# Patient Record
Sex: FEMALE | Race: WHITE | NOT HISPANIC OR LATINO | Employment: FULL TIME | URBAN - METROPOLITAN AREA
[De-identification: names, ages, dates, MRNs, and addresses within clinical notes are randomized per-mention and may not be internally consistent; named-entity substitution may affect disease eponyms.]

---

## 2017-01-09 DIAGNOSIS — O01.9 GESTATIONAL TROPHOBLASTIC DISEASE: ICD-10-CM

## 2017-01-09 RX ORDER — NORGESTIMATE AND ETHINYL ESTRADIOL 7DAYSX3 28
1 KIT ORAL DAILY
Qty: 28 TABLET | Refills: 11 | Status: SHIPPED | OUTPATIENT
Start: 2017-01-09 | End: 2017-12-18 | Stop reason: SDUPTHER

## 2017-01-31 ENCOUNTER — TELEPHONE (OUTPATIENT)
Dept: GYNECOLOGIC ONCOLOGY | Facility: CLINIC | Age: 27
End: 2017-01-31

## 2017-01-31 ENCOUNTER — PATIENT MESSAGE (OUTPATIENT)
Dept: GYNECOLOGIC ONCOLOGY | Facility: CLINIC | Age: 27
End: 2017-01-31

## 2017-01-31 ENCOUNTER — LAB VISIT (OUTPATIENT)
Dept: LAB | Facility: HOSPITAL | Age: 27
End: 2017-01-31
Attending: OBSTETRICS & GYNECOLOGY
Payer: COMMERCIAL

## 2017-01-31 DIAGNOSIS — O01.9 GESTATIONAL TROPHOBLASTIC DISEASE: Primary | ICD-10-CM

## 2017-01-31 DIAGNOSIS — O01.9 GESTATIONAL TROPHOBLASTIC DISEASE: ICD-10-CM

## 2017-01-31 LAB — HCG INTACT+B SERPL-ACNC: <1.2 MIU/ML

## 2017-01-31 PROCEDURE — 36415 COLL VENOUS BLD VENIPUNCTURE: CPT | Mod: PO

## 2017-01-31 PROCEDURE — 84702 CHORIONIC GONADOTROPIN TEST: CPT

## 2017-02-21 ENCOUNTER — PATIENT MESSAGE (OUTPATIENT)
Dept: GYNECOLOGIC ONCOLOGY | Facility: CLINIC | Age: 27
End: 2017-02-21

## 2017-02-27 ENCOUNTER — LAB VISIT (OUTPATIENT)
Dept: LAB | Facility: HOSPITAL | Age: 27
End: 2017-02-27
Attending: OBSTETRICS & GYNECOLOGY
Payer: COMMERCIAL

## 2017-02-27 DIAGNOSIS — O01.9 GESTATIONAL TROPHOBLASTIC DISEASE: ICD-10-CM

## 2017-02-27 LAB — HCG INTACT+B SERPL-ACNC: <1.2 MIU/ML

## 2017-02-27 PROCEDURE — 84702 CHORIONIC GONADOTROPIN TEST: CPT

## 2017-02-27 PROCEDURE — 36415 COLL VENOUS BLD VENIPUNCTURE: CPT | Mod: PO

## 2017-03-06 ENCOUNTER — PATIENT MESSAGE (OUTPATIENT)
Dept: GYNECOLOGIC ONCOLOGY | Facility: CLINIC | Age: 27
End: 2017-03-06

## 2017-03-29 ENCOUNTER — LAB VISIT (OUTPATIENT)
Dept: LAB | Facility: HOSPITAL | Age: 27
End: 2017-03-29
Attending: OBSTETRICS & GYNECOLOGY
Payer: COMMERCIAL

## 2017-03-29 ENCOUNTER — OFFICE VISIT (OUTPATIENT)
Dept: GYNECOLOGIC ONCOLOGY | Facility: CLINIC | Age: 27
End: 2017-03-29
Payer: COMMERCIAL

## 2017-03-29 VITALS
HEART RATE: 77 BPM | DIASTOLIC BLOOD PRESSURE: 66 MMHG | SYSTOLIC BLOOD PRESSURE: 111 MMHG | BODY MASS INDEX: 29.29 KG/M2 | WEIGHT: 187 LBS

## 2017-03-29 DIAGNOSIS — O01.9 GESTATIONAL TROPHOBLASTIC DISEASE: ICD-10-CM

## 2017-03-29 DIAGNOSIS — O01.9 GESTATIONAL TROPHOBLASTIC DISEASE: Primary | ICD-10-CM

## 2017-03-29 DIAGNOSIS — Z01.419 WELL WOMAN EXAM WITH ROUTINE GYNECOLOGICAL EXAM: ICD-10-CM

## 2017-03-29 LAB — HCG INTACT+B SERPL-ACNC: <1.2 MIU/ML

## 2017-03-29 PROCEDURE — 84702 CHORIONIC GONADOTROPIN TEST: CPT

## 2017-03-29 PROCEDURE — 99999 PR PBB SHADOW E&M-EST. PATIENT-LVL III: CPT | Mod: PBBFAC,,, | Performed by: OBSTETRICS & GYNECOLOGY

## 2017-03-29 PROCEDURE — 36415 COLL VENOUS BLD VENIPUNCTURE: CPT

## 2017-03-29 PROCEDURE — 99214 OFFICE O/P EST MOD 30 MIN: CPT | Mod: S$GLB,,, | Performed by: OBSTETRICS & GYNECOLOGY

## 2017-03-29 PROCEDURE — 88175 CYTOPATH C/V AUTO FLUID REDO: CPT

## 2017-03-29 PROCEDURE — 1160F RVW MEDS BY RX/DR IN RCRD: CPT | Mod: S$GLB,,, | Performed by: OBSTETRICS & GYNECOLOGY

## 2017-03-29 NOTE — PROGRESS NOTES
Subjective:      Patient ID: Adalgisa Ramos is a 26 y.o. female.    Chief Complaint: GTD (3mth f/u)      HPI  Patient is a 25yo female  who originally presented to establish care for active gestational trophoblastic disease. LMP 2016 and presented to her ob/gyn at approximately 14weeks gestation. She had a 20 week size uterus with US findings consistent with molar gestation. She underwent D&C 16, final pathology showing compete mole. Pre-evacuation bhcg 310,8150.      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 WHO score 6, low risk. They elected to treat with MTX Days 1-5 q2 weeks. She received C1 on 16 and is now s/p C5 (last treatment 16).      bhcg trend:   36715  7/15 14373   5672   1363   719   251   72    38   34   31      Failed MTX, changed to Act-D.      S/p C5 of Act-D with normalization of hcg. HCG decreased to 39>19>8.2>5.2>1.5>1.9><1.2><1.2><1.2. hcg remains normal (completed treatment 16).     Patient presents today for routine follow up visit. States she has been doing well and is without gyn complaints. Monthly hcg levels have all been < 1.2, including todays hcg. She has been taking OCPs for contraception, and states her LMP was  and normal.      Review of Systems   Constitutional: Negative for appetite change, chills, fatigue and fever.   HENT: Negative for mouth sores.    Respiratory: Negative for cough and shortness of breath.    Cardiovascular: Negative for leg swelling.   Gastrointestinal: Negative for abdominal pain, blood in stool, constipation and diarrhea.   Endocrine: Negative for cold intolerance.   Genitourinary: Negative for dysuria and vaginal bleeding.   Musculoskeletal: Negative for myalgias.   Skin: Negative for rash.   Allergic/Immunologic: Negative.    Neurological: Negative for  weakness and numbness.   Hematological: Negative for adenopathy. Does not bruise/bleed easily.   Psychiatric/Behavioral: Negative for confusion.        Objective:   Physical Exam:   Constitutional: She is oriented to person, place, and time. She appears well-developed and well-nourished. No distress.    HENT:   Head: Normocephalic and atraumatic.    Eyes: EOM are normal. Pupils are equal, round, and reactive to light. No scleral icterus.    Neck: Normal range of motion. Neck supple. No thyromegaly present.    Cardiovascular: Normal rate, regular rhythm, normal heart sounds and intact distal pulses.  Exam reveals no cyanosis and no edema.     Pulmonary/Chest: Effort normal and breath sounds normal. No tachypnea. No respiratory distress. She has no wheezes. She has no rhonchi. She has no rales. She exhibits no tenderness.        Abdominal: Soft. Bowel sounds are normal. She exhibits no distension, no ascites and no mass. There is no hepatosplenomegaly. There is no tenderness. There is no rigidity, no rebound and no guarding. No hernia.     Genitourinary: Rectum normal, vagina normal and uterus normal. Pelvic exam was performed with patient supine. There is no rash, tenderness or lesion on the right labia. There is no rash, tenderness or lesion on the left labia. Uterus is not tender. Cervix is normal. Right adnexum displays no mass, no tenderness and no fullness. Left adnexum displays no mass, no tenderness and no fullness. No bleeding in the vagina. No vaginal discharge found. Labial bartholins normal.  Genitourinary Comments: Very small introitus made obtaining pap difficult.           Musculoskeletal: Normal range of motion and moves all extremeties. She exhibits no edema.      Lymphadenopathy:     She has no cervical adenopathy.        Right: No inguinal and no supraclavicular adenopathy present.        Left: No inguinal and no supraclavicular adenopathy present.    Neurological: She is alert and oriented to  person, place, and time. No sensory deficit.    Skin: Skin is warm and dry. No rash noted. No cyanosis or erythema.    Psychiatric: She has a normal mood and affect. Her behavior is normal. Thought content normal.       Assessment:     1. Gestational trophoblastic disease    2. Well woman exam with routine gynecological exam          Plan:     Gestational trophoblastic disease   - RAISA on exam   - RTC in 3 months or sooner if needed   - Continue monthly [facility]     Well woman exam with routine gynecological exam  -     Liquid-based pap smear, diagnostic

## 2017-04-04 ENCOUNTER — TELEPHONE (OUTPATIENT)
Dept: GYNECOLOGIC ONCOLOGY | Facility: CLINIC | Age: 27
End: 2017-04-04

## 2017-04-04 NOTE — TELEPHONE ENCOUNTER
----- Message from Kathleen Gill PA-C sent at 4/4/2017 12:48 PM CDT -----  Please let the patient know that her pap was normal. Thank you!

## 2017-04-05 ENCOUNTER — TELEPHONE (OUTPATIENT)
Dept: GYNECOLOGIC ONCOLOGY | Facility: CLINIC | Age: 27
End: 2017-04-05

## 2017-04-27 ENCOUNTER — LAB VISIT (OUTPATIENT)
Dept: LAB | Facility: HOSPITAL | Age: 27
End: 2017-04-27
Attending: OBSTETRICS & GYNECOLOGY
Payer: COMMERCIAL

## 2017-04-27 DIAGNOSIS — O01.9 GESTATIONAL TROPHOBLASTIC DISEASE: ICD-10-CM

## 2017-04-27 LAB — HCG INTACT+B SERPL-ACNC: <1.2 MIU/ML

## 2017-04-27 PROCEDURE — 36415 COLL VENOUS BLD VENIPUNCTURE: CPT | Mod: PO

## 2017-04-27 PROCEDURE — 84702 CHORIONIC GONADOTROPIN TEST: CPT

## 2017-05-19 ENCOUNTER — PATIENT MESSAGE (OUTPATIENT)
Dept: GYNECOLOGIC ONCOLOGY | Facility: CLINIC | Age: 27
End: 2017-05-19

## 2017-05-29 ENCOUNTER — LAB VISIT (OUTPATIENT)
Dept: LAB | Facility: HOSPITAL | Age: 27
End: 2017-05-29
Attending: OBSTETRICS & GYNECOLOGY
Payer: COMMERCIAL

## 2017-05-29 DIAGNOSIS — O01.9 GESTATIONAL TROPHOBLASTIC DISEASE: ICD-10-CM

## 2017-05-29 LAB — HCG INTACT+B SERPL-ACNC: <1.2 MIU/ML

## 2017-05-29 PROCEDURE — 36415 COLL VENOUS BLD VENIPUNCTURE: CPT | Mod: PO

## 2017-05-29 PROCEDURE — 84702 CHORIONIC GONADOTROPIN TEST: CPT

## 2017-06-28 ENCOUNTER — LAB VISIT (OUTPATIENT)
Dept: LAB | Facility: HOSPITAL | Age: 27
End: 2017-06-28
Attending: OBSTETRICS & GYNECOLOGY
Payer: COMMERCIAL

## 2017-06-28 ENCOUNTER — OFFICE VISIT (OUTPATIENT)
Dept: GYNECOLOGIC ONCOLOGY | Facility: CLINIC | Age: 27
End: 2017-06-28
Payer: COMMERCIAL

## 2017-06-28 VITALS
HEART RATE: 64 BPM | SYSTOLIC BLOOD PRESSURE: 105 MMHG | DIASTOLIC BLOOD PRESSURE: 72 MMHG | BODY MASS INDEX: 28.19 KG/M2 | WEIGHT: 180 LBS

## 2017-06-28 DIAGNOSIS — O01.9 GESTATIONAL TROPHOBLASTIC DISEASE: Primary | ICD-10-CM

## 2017-06-28 DIAGNOSIS — O01.9 GESTATIONAL TROPHOBLASTIC DISEASE: ICD-10-CM

## 2017-06-28 LAB — HCG INTACT+B SERPL-ACNC: <1.2 MIU/ML

## 2017-06-28 PROCEDURE — 99999 PR PBB SHADOW E&M-EST. PATIENT-LVL II: CPT | Mod: PBBFAC,,, | Performed by: OBSTETRICS & GYNECOLOGY

## 2017-06-28 PROCEDURE — 84702 CHORIONIC GONADOTROPIN TEST: CPT

## 2017-06-28 PROCEDURE — 99214 OFFICE O/P EST MOD 30 MIN: CPT | Mod: S$GLB,,, | Performed by: OBSTETRICS & GYNECOLOGY

## 2017-06-28 PROCEDURE — 36415 COLL VENOUS BLD VENIPUNCTURE: CPT

## 2017-07-04 NOTE — PROGRESS NOTES
Subjective:      Patient ID: Adalgisa Ramos is a 27 y.o. female.    Chief Complaint: Follow-up (3mth)      HPI  Patient is a 25yo female  who originally presented to establish care for active gestational trophoblastic disease. LMP 2016 and presented to her ob/gyn at approximately 14weeks gestation. She had a 20 week size uterus with US findings consistent with molar gestation. She underwent D&C 16, final pathology showing compete mole. Pre-evacuation bhcg 310,8150.      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 WHO score 6, low risk. They elected to treat with MTX Days 1-5 q2 weeks. She received C1 on 16 and is now s/p C5 (last treatment 16).      bhcg trend:   37641  7/15 24041   5672   1363   719   251   72    38   34   31      Failed MTX, changed to Act-D.      S/p C5 of Act-D with normalization of hcg. HCG decreased to 39>19>8.2>5.2>1.5>1.9><1.2><1.2><1.2. hcg remains normal (completed treatment 16).      Patient presents today for routine follow up visit. States she has been doing well and is without gyn complaints. Monthly hcg levels have all been < 1.2, including todays hcg. She has been taking OCPs for contraception.    Review of Systems   Constitutional: Negative for appetite change, chills, fatigue and fever.   HENT: Negative for mouth sores.    Respiratory: Negative for cough and shortness of breath.    Cardiovascular: Negative for leg swelling.   Gastrointestinal: Negative for abdominal pain, blood in stool, constipation and diarrhea.   Endocrine: Negative for cold intolerance.   Genitourinary: Negative for dysuria and vaginal bleeding.   Musculoskeletal: Negative for myalgias.   Skin: Negative for rash.   Allergic/Immunologic: Negative.    Neurological: Negative for weakness and numbness.    Hematological: Negative for adenopathy. Does not bruise/bleed easily.   Psychiatric/Behavioral: Negative for confusion.        Objective:   Physical Exam:   Constitutional: She is oriented to person, place, and time. She appears well-developed and well-nourished.    HENT:   Head: Normocephalic and atraumatic.    Eyes: EOM are normal. Pupils are equal, round, and reactive to light.    Neck: Normal range of motion. Neck supple. No thyromegaly present.    Cardiovascular: Normal rate, regular rhythm and intact distal pulses.     Pulmonary/Chest: Effort normal and breath sounds normal. No respiratory distress. She has no wheezes.        Abdominal: Soft. Bowel sounds are normal. She exhibits no distension, no ascites and no mass. There is no tenderness.     Genitourinary: Rectum normal, vagina normal and uterus normal. Pelvic exam was performed with patient supine. There is no lesion on the right labia. There is no lesion on the left labia. Cervix is normal. Right adnexum displays no mass. Left adnexum displays no mass.           Musculoskeletal: Normal range of motion and moves all extremeties.      Lymphadenopathy:     She has no cervical adenopathy.        Right: No inguinal and no supraclavicular adenopathy present.        Left: No inguinal and no supraclavicular adenopathy present.    Neurological: She is alert and oriented to person, place, and time.    Skin: Skin is warm and dry. No rash noted.    Psychiatric: She has a normal mood and affect.       Assessment:     1. Gestational trophoblastic disease      - no evidence of disease on today's exam  - hcg remains normal  - disease free interval 7 months    Plan:   No orders of the defined types were placed in this encounter.    RTC 3 months for surveillance or sooner if needed. Continue OCPs for contraception, stressed importance with compliance. Continue q monthly hcg.

## 2017-07-15 ENCOUNTER — PATIENT MESSAGE (OUTPATIENT)
Dept: GYNECOLOGIC ONCOLOGY | Facility: CLINIC | Age: 27
End: 2017-07-15

## 2017-07-26 ENCOUNTER — PATIENT MESSAGE (OUTPATIENT)
Dept: GYNECOLOGIC ONCOLOGY | Facility: CLINIC | Age: 27
End: 2017-07-26

## 2017-07-31 ENCOUNTER — LAB VISIT (OUTPATIENT)
Dept: LAB | Facility: HOSPITAL | Age: 27
End: 2017-07-31
Attending: OBSTETRICS & GYNECOLOGY
Payer: COMMERCIAL

## 2017-07-31 DIAGNOSIS — O01.9 GESTATIONAL TROPHOBLASTIC DISEASE: ICD-10-CM

## 2017-07-31 LAB — HCG INTACT+B SERPL-ACNC: <1.2 MIU/ML

## 2017-07-31 PROCEDURE — 84702 CHORIONIC GONADOTROPIN TEST: CPT

## 2017-07-31 PROCEDURE — 36415 COLL VENOUS BLD VENIPUNCTURE: CPT | Mod: PO

## 2017-08-28 ENCOUNTER — LAB VISIT (OUTPATIENT)
Dept: LAB | Facility: HOSPITAL | Age: 27
End: 2017-08-28
Attending: OBSTETRICS & GYNECOLOGY
Payer: COMMERCIAL

## 2017-08-28 DIAGNOSIS — O01.9 GESTATIONAL TROPHOBLASTIC DISEASE: ICD-10-CM

## 2017-08-28 LAB — HCG INTACT+B SERPL-ACNC: <1.2 MIU/ML

## 2017-08-28 PROCEDURE — 84702 CHORIONIC GONADOTROPIN TEST: CPT

## 2017-08-28 PROCEDURE — 36415 COLL VENOUS BLD VENIPUNCTURE: CPT | Mod: PO

## 2017-09-25 ENCOUNTER — LAB VISIT (OUTPATIENT)
Dept: LAB | Facility: HOSPITAL | Age: 27
End: 2017-09-25
Attending: OBSTETRICS & GYNECOLOGY
Payer: COMMERCIAL

## 2017-09-25 ENCOUNTER — OFFICE VISIT (OUTPATIENT)
Dept: GYNECOLOGIC ONCOLOGY | Facility: CLINIC | Age: 27
End: 2017-09-25
Payer: COMMERCIAL

## 2017-09-25 VITALS
HEART RATE: 85 BPM | WEIGHT: 178.63 LBS | BODY MASS INDEX: 27.97 KG/M2 | DIASTOLIC BLOOD PRESSURE: 65 MMHG | SYSTOLIC BLOOD PRESSURE: 108 MMHG

## 2017-09-25 DIAGNOSIS — O01.9 GESTATIONAL TROPHOBLASTIC DISEASE: Primary | ICD-10-CM

## 2017-09-25 DIAGNOSIS — O01.9 GESTATIONAL TROPHOBLASTIC DISEASE: ICD-10-CM

## 2017-09-25 LAB — HCG INTACT+B SERPL-ACNC: 1.6 MIU/ML

## 2017-09-25 PROCEDURE — 99999 PR PBB SHADOW E&M-EST. PATIENT-LVL III: CPT | Mod: PBBFAC,,, | Performed by: OBSTETRICS & GYNECOLOGY

## 2017-09-25 PROCEDURE — 3008F BODY MASS INDEX DOCD: CPT | Mod: S$GLB,,, | Performed by: OBSTETRICS & GYNECOLOGY

## 2017-09-25 PROCEDURE — 84702 CHORIONIC GONADOTROPIN TEST: CPT

## 2017-09-25 PROCEDURE — 36415 COLL VENOUS BLD VENIPUNCTURE: CPT

## 2017-09-25 PROCEDURE — 99214 OFFICE O/P EST MOD 30 MIN: CPT | Mod: S$GLB,,, | Performed by: OBSTETRICS & GYNECOLOGY

## 2017-09-26 ENCOUNTER — TELEPHONE (OUTPATIENT)
Dept: GYNECOLOGIC ONCOLOGY | Facility: CLINIC | Age: 27
End: 2017-09-26

## 2017-09-26 DIAGNOSIS — O01.9 GESTATIONAL TROPHOBLASTIC DISEASE: Primary | ICD-10-CM

## 2017-09-26 NOTE — TELEPHONE ENCOUNTER
Called and discussed bhcg results of 1.6 (prior values have been <1.2). I have recommended trending the value over the next 2 weeks. She will draw each Monday. Instructions given and lab orders entered. Will route message to staff to assist with scheduling.     She voiced understanding.

## 2017-09-28 NOTE — PROGRESS NOTES
Subjective:      Patient ID: Adalgisa Ramos is a 27 y.o. female.    Chief Complaint: Follow-up (3 month visit)      HPI  Patient is a 25yo female  who originally presented to establish care for active gestational trophoblastic disease. LMP 2016 and presented to her ob/gyn at approximately 14weeks gestation. She had a 20 week size uterus with US findings consistent with molar gestation. She underwent D&C 16, final pathology showing compete mole. Pre-evacuation bhcg 310,8150.      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 WHO score 6, low risk. They elected to treat with MTX Days 1-5 q2 weeks. She received C1 on 16 and is now s/p C5 (last treatment 16).      bhcg trend:   99320  7/15 21054   5672   1363   719   251   72    38   34   31      Failed MTX, changed to Act-D.      S/p C5 of Act-D with normalization of hcg. HCG decreased to 39>19>8.2>5.2>1.5>1.9><1.2><1.2><1.2. (completed treatment 16).      Patient presents today for routine follow up visit. States she has been doing well and is without gyn complaints. She has been taking OCPs for contraception. hcg pending today.  Review of Systems   Constitutional: Negative for appetite change, chills, fatigue and fever.   HENT: Negative for mouth sores.    Respiratory: Negative for cough and shortness of breath.    Cardiovascular: Negative for leg swelling.   Gastrointestinal: Negative for abdominal pain, blood in stool, constipation and diarrhea.   Endocrine: Negative for cold intolerance.   Genitourinary: Negative for dysuria and vaginal bleeding.   Musculoskeletal: Negative for myalgias.   Skin: Negative for rash.   Allergic/Immunologic: Negative.    Neurological: Negative for weakness and numbness.   Hematological: Negative for adenopathy. Does not bruise/bleed  easily.   Psychiatric/Behavioral: Negative for confusion.        Objective:   Physical Exam:   Constitutional: She is oriented to person, place, and time. She appears well-developed and well-nourished.    HENT:   Head: Normocephalic and atraumatic.    Eyes: EOM are normal. Pupils are equal, round, and reactive to light.    Neck: Normal range of motion. Neck supple. No thyromegaly present.    Cardiovascular: Normal rate, regular rhythm and intact distal pulses.     Pulmonary/Chest: Effort normal and breath sounds normal. No respiratory distress. She has no wheezes.        Abdominal: Soft. Bowel sounds are normal. She exhibits no distension, no ascites and no mass. There is no tenderness.     Genitourinary: Rectum normal, vagina normal and uterus normal. Pelvic exam was performed with patient supine. There is no lesion on the right labia. There is no lesion on the left labia. Cervix is normal. Right adnexum displays no mass. Left adnexum displays no mass.   Genitourinary Comments: menstrual bleeding           Musculoskeletal: Normal range of motion and moves all extremeties.      Lymphadenopathy:     She has no cervical adenopathy.        Right: No inguinal and no supraclavicular adenopathy present.        Left: No inguinal and no supraclavicular adenopathy present.    Neurological: She is alert and oriented to person, place, and time.    Skin: Skin is warm and dry. No rash noted.    Psychiatric: She has a normal mood and affect.       Assessment:     1. Gestational trophoblastic disease      - no evidence of disease on today's exam  - hcg pending  - disease free interval 9 months  - on OCPs for contraception    Plan:   No orders of the defined types were placed in this encounter.    RTC 3 months for surveillance or sooner if need. Continue to follow hcg.

## 2017-10-02 ENCOUNTER — LAB VISIT (OUTPATIENT)
Dept: LAB | Facility: HOSPITAL | Age: 27
End: 2017-10-02
Attending: OBSTETRICS & GYNECOLOGY
Payer: COMMERCIAL

## 2017-10-02 DIAGNOSIS — O01.9 GESTATIONAL TROPHOBLASTIC DISEASE: ICD-10-CM

## 2017-10-02 LAB — HCG INTACT+B SERPL-ACNC: 1.9 MIU/ML

## 2017-10-02 PROCEDURE — 84702 CHORIONIC GONADOTROPIN TEST: CPT

## 2017-10-02 PROCEDURE — 36415 COLL VENOUS BLD VENIPUNCTURE: CPT | Mod: PO

## 2017-10-09 ENCOUNTER — LAB VISIT (OUTPATIENT)
Dept: LAB | Facility: HOSPITAL | Age: 27
End: 2017-10-09
Attending: OBSTETRICS & GYNECOLOGY
Payer: COMMERCIAL

## 2017-10-09 DIAGNOSIS — O01.9 GESTATIONAL TROPHOBLASTIC DISEASE: ICD-10-CM

## 2017-10-09 LAB — HCG INTACT+B SERPL-ACNC: <1.2 MIU/ML

## 2017-10-09 PROCEDURE — 84702 CHORIONIC GONADOTROPIN TEST: CPT

## 2017-10-09 PROCEDURE — 36415 COLL VENOUS BLD VENIPUNCTURE: CPT | Mod: PO

## 2017-10-10 ENCOUNTER — TELEPHONE (OUTPATIENT)
Dept: GYNECOLOGIC ONCOLOGY | Facility: CLINIC | Age: 27
End: 2017-10-10

## 2017-10-10 NOTE — TELEPHONE ENCOUNTER
Called and let patient know hcg <1.2. No upward trend or signs of recurrent GTD. She voiced understanding. Will continue with monthly hcg check.

## 2017-11-15 ENCOUNTER — PATIENT MESSAGE (OUTPATIENT)
Dept: GYNECOLOGIC ONCOLOGY | Facility: CLINIC | Age: 27
End: 2017-11-15

## 2017-11-16 ENCOUNTER — LAB VISIT (OUTPATIENT)
Dept: LAB | Facility: HOSPITAL | Age: 27
End: 2017-11-16
Attending: OBSTETRICS & GYNECOLOGY
Payer: COMMERCIAL

## 2017-11-16 DIAGNOSIS — O01.9 GESTATIONAL TROPHOBLASTIC DISEASE: ICD-10-CM

## 2017-11-16 LAB — HCG INTACT+B SERPL-ACNC: <1.2 MIU/ML

## 2017-11-16 PROCEDURE — 36415 COLL VENOUS BLD VENIPUNCTURE: CPT | Mod: PO

## 2017-11-16 PROCEDURE — 84702 CHORIONIC GONADOTROPIN TEST: CPT

## 2017-12-18 ENCOUNTER — TELEPHONE (OUTPATIENT)
Dept: GYNECOLOGIC ONCOLOGY | Facility: CLINIC | Age: 27
End: 2017-12-18

## 2017-12-18 ENCOUNTER — OFFICE VISIT (OUTPATIENT)
Dept: GYNECOLOGIC ONCOLOGY | Facility: CLINIC | Age: 27
End: 2017-12-18
Payer: COMMERCIAL

## 2017-12-18 ENCOUNTER — PATIENT MESSAGE (OUTPATIENT)
Dept: GYNECOLOGIC ONCOLOGY | Facility: CLINIC | Age: 27
End: 2017-12-18

## 2017-12-18 VITALS
SYSTOLIC BLOOD PRESSURE: 122 MMHG | WEIGHT: 180.81 LBS | HEART RATE: 86 BPM | BODY MASS INDEX: 28.32 KG/M2 | DIASTOLIC BLOOD PRESSURE: 81 MMHG

## 2017-12-18 DIAGNOSIS — O01.9 GESTATIONAL TROPHOBLASTIC DISEASE: ICD-10-CM

## 2017-12-18 PROCEDURE — 99213 OFFICE O/P EST LOW 20 MIN: CPT | Mod: S$GLB,,, | Performed by: OBSTETRICS & GYNECOLOGY

## 2017-12-18 PROCEDURE — 99999 PR PBB SHADOW E&M-EST. PATIENT-LVL III: CPT | Mod: PBBFAC,,, | Performed by: OBSTETRICS & GYNECOLOGY

## 2017-12-18 RX ORDER — NORGESTIMATE AND ETHINYL ESTRADIOL 7DAYSX3 28
1 KIT ORAL DAILY
Qty: 28 TABLET | Refills: 11 | Status: SHIPPED | OUTPATIENT
Start: 2017-12-18 | End: 2018-01-22 | Stop reason: SDUPTHER

## 2017-12-18 NOTE — PROGRESS NOTES
Subjective:      Patient ID: Adalgisa Ramos is a 27 y.o. female.    Chief Complaint: Follow-up      HPI  Patient is a 27yo female  who originally presented to establish care for active gestational trophoblastic disease. LMP 2016 and presented to her ob/gyn at approximately 14weeks gestation. She had a 20 week size uterus with US findings consistent with molar gestation. She underwent D&C 16, final pathology showing compete mole. Pre-evacuation bhcg 310,8150.      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 WHO score 6, low risk. They elected to treat with MTX Days 1-5 q2 weeks. She received C1 on 16 and is now s/p C5 (last treatment 16).      bhcg trend:   49173  7/15 77467   5672   1363   719   251   72    38   34   31      Failed MTX, changed to Act-D.      S/p C5 of Act-D with normalization of hcg. HCG decreased to 39>19>8.2>5.2>1.5>1.9><1.2><1.2><1.2>1.9><1.2><1.2. (completed treatment 16).     Pap 3/29/17 negative.      Patient presents today for routine follow up visit. Hcg normalized x 1 year. States she has been doing well and is without gyn complaints. She has been taking OCPs for contraception.     Review of Systems   Constitutional: Negative for appetite change, chills, fatigue and fever.   HENT: Negative for mouth sores.    Respiratory: Negative for cough and shortness of breath.    Cardiovascular: Negative for leg swelling.   Gastrointestinal: Negative for abdominal pain, blood in stool, constipation and diarrhea.   Endocrine: Negative for cold intolerance.   Genitourinary: Negative for dysuria and vaginal bleeding.   Musculoskeletal: Negative for myalgias.   Skin: Negative for rash.   Allergic/Immunologic: Negative.    Neurological: Negative for weakness and numbness.   Hematological: Negative for  adenopathy. Does not bruise/bleed easily.   Psychiatric/Behavioral: Negative for confusion.       Objective:   Physical Exam:   Constitutional: She is oriented to person, place, and time. She appears well-developed and well-nourished.    HENT:   Head: Normocephalic and atraumatic.    Eyes: EOM are normal. Pupils are equal, round, and reactive to light.    Neck: Normal range of motion. Neck supple. No thyromegaly present.    Cardiovascular: Normal rate, regular rhythm and intact distal pulses.     Pulmonary/Chest: Effort normal and breath sounds normal. No respiratory distress. She has no wheezes.        Abdominal: Soft. Bowel sounds are normal. She exhibits no distension, no ascites and no mass. There is no tenderness.     Genitourinary: Rectum normal, vagina normal and uterus normal. Pelvic exam was performed with patient supine. There is no lesion on the right labia. There is no lesion on the left labia. Cervix is normal. Right adnexum displays no mass. Left adnexum displays no mass.           Musculoskeletal: Normal range of motion and moves all extremeties.      Lymphadenopathy:     She has no cervical adenopathy.        Right: No inguinal and no supraclavicular adenopathy present.        Left: No inguinal and no supraclavicular adenopathy present.    Neurological: She is alert and oriented to person, place, and time.    Skin: Skin is warm and dry. No rash noted.    Psychiatric: She has a normal mood and affect.       Assessment:     1. Gestational trophoblastic disease      - no evidence of disease  - normalized hcg x 1year    Plan:     Orders Placed This Encounter   Procedures    hCG, quantitative     Okay to return to routine well woman care. May follow up with me as needed.

## 2017-12-21 ENCOUNTER — LAB VISIT (OUTPATIENT)
Dept: LAB | Facility: HOSPITAL | Age: 27
End: 2017-12-21
Attending: OBSTETRICS & GYNECOLOGY
Payer: COMMERCIAL

## 2017-12-21 DIAGNOSIS — O01.9 GESTATIONAL TROPHOBLASTIC DISEASE: ICD-10-CM

## 2017-12-21 LAB — HCG INTACT+B SERPL-ACNC: <1.2 MIU/ML

## 2017-12-21 PROCEDURE — 36415 COLL VENOUS BLD VENIPUNCTURE: CPT | Mod: PO

## 2017-12-21 PROCEDURE — 84702 CHORIONIC GONADOTROPIN TEST: CPT

## 2018-01-22 DIAGNOSIS — O01.9 GESTATIONAL TROPHOBLASTIC DISEASE: ICD-10-CM

## 2018-01-22 RX ORDER — NORGESTIMATE AND ETHINYL ESTRADIOL 7DAYSX3 28
1 KIT ORAL DAILY
Qty: 28 TABLET | Refills: 11 | Status: SHIPPED | OUTPATIENT
Start: 2018-01-22

## 2018-05-05 ENCOUNTER — HOSPITAL ENCOUNTER (EMERGENCY)
Facility: HOSPITAL | Age: 28
Discharge: HOME | End: 2018-05-05
Attending: EMERGENCY MEDICINE
Payer: COMMERCIAL

## 2018-05-05 VITALS
OXYGEN SATURATION: 100 % | DIASTOLIC BLOOD PRESSURE: 74 MMHG | HEIGHT: 69 IN | SYSTOLIC BLOOD PRESSURE: 118 MMHG | BODY MASS INDEX: 26.66 KG/M2 | WEIGHT: 180 LBS | HEART RATE: 97 BPM | RESPIRATION RATE: 16 BRPM | TEMPERATURE: 99 F

## 2018-05-05 DIAGNOSIS — R51.9 FRONTAL HEADACHE: ICD-10-CM

## 2018-05-05 DIAGNOSIS — R05.9 COUGH: ICD-10-CM

## 2018-05-05 DIAGNOSIS — B30.9 VIRAL CONJUNCTIVITIS OF LEFT EYE: ICD-10-CM

## 2018-05-05 DIAGNOSIS — J20.9 ACUTE BRONCHITIS, UNSPECIFIED ORGANISM: Primary | ICD-10-CM

## 2018-05-05 PROCEDURE — 96372 THER/PROPH/DIAG INJ SC/IM: CPT

## 2018-05-05 PROCEDURE — 99284 EMERGENCY DEPT VISIT MOD MDM: CPT | Mod: 25

## 2018-05-05 PROCEDURE — 63600175 PHARM REV CODE 636 W HCPCS: Performed by: EMERGENCY MEDICINE

## 2018-05-05 RX ORDER — IBUPROFEN 800 MG/1
800 TABLET ORAL EVERY 6 HOURS PRN
Qty: 20 TABLET | Refills: 0 | Status: SHIPPED | OUTPATIENT
Start: 2018-05-05

## 2018-05-05 RX ORDER — KETOROLAC TROMETHAMINE 30 MG/ML
30 INJECTION, SOLUTION INTRAMUSCULAR; INTRAVENOUS
Status: COMPLETED | OUTPATIENT
Start: 2018-05-05 | End: 2018-05-05

## 2018-05-05 RX ORDER — METHYLPREDNISOLONE SOD SUCC 125 MG
125 VIAL (EA) INJECTION
Status: COMPLETED | OUTPATIENT
Start: 2018-05-05 | End: 2018-05-05

## 2018-05-05 RX ORDER — PREDNISONE 50 MG/1
50 TABLET ORAL DAILY
Qty: 4 TABLET | Refills: 0 | Status: SHIPPED | OUTPATIENT
Start: 2018-05-05 | End: 2018-05-09

## 2018-05-05 RX ORDER — ALBUTEROL SULFATE 90 UG/1
1-2 AEROSOL, METERED RESPIRATORY (INHALATION) EVERY 6 HOURS PRN
Qty: 1 INHALER | Refills: 0 | Status: SHIPPED | OUTPATIENT
Start: 2018-05-05 | End: 2019-05-05

## 2018-05-05 RX ORDER — BENZONATATE 100 MG/1
100 CAPSULE ORAL 3 TIMES DAILY PRN
Qty: 20 CAPSULE | Refills: 0 | Status: SHIPPED | OUTPATIENT
Start: 2018-05-05 | End: 2018-05-12

## 2018-05-05 RX ADMIN — METHYLPREDNISOLONE SODIUM SUCCINATE 125 MG: 125 INJECTION, POWDER, FOR SOLUTION INTRAMUSCULAR; INTRAVENOUS at 03:05

## 2018-05-05 RX ADMIN — KETOROLAC TROMETHAMINE 30 MG: 30 INJECTION, SOLUTION INTRAMUSCULAR at 03:05

## 2018-05-06 NOTE — ED PROVIDER NOTES
Encounter Date: 5/5/2018       History     Chief Complaint   Patient presents with    General Illness     congestion x2weeks, L eye red and teary     This patient is a 27-year-old female with a past history of gestational trophoblastic neoplasm, asthma presenting with complaints of approximately 2 weeks of initially productive cough that is now dry, chest congestion, nasal drainage.  She denies past history of lung disease or that she is a smoker.  She reports her symptoms have been refractory to low-dose over-the-counter decongestant.  She denies associated chest pain, fever, unilateral or bilateral lower extremity swelling, or a past history of thromboembolic disease.  She does report that she takes birth control.  Within the past 12 hr she has now worsened mild left eye redness with some clear scant drainage.          Review of patient's allergies indicates:  No Known Allergies  Past Medical History:   Diagnosis Date    Asthma     Encounter for chemotherapy management 10/7/2016    Gestational trophoblastic neoplasm      No past surgical history on file.  Family History   Problem Relation Age of Onset    Diabetes Mother     Hypertension Father     Ovarian cysts Sister     Uterine cancer Paternal Aunt     Diabetes Maternal Grandmother     Melanoma Maternal Grandmother     Melanoma Maternal Grandfather     Leukemia Maternal Grandfather     Diabetes Paternal Grandmother     Pancreatic cancer Paternal Grandfather      Social History   Substance Use Topics    Smoking status: Never Smoker    Smokeless tobacco: Never Used    Alcohol use No     Review of Systems   Constitutional: Negative for fever.   HENT: Positive for congestion.    Eyes: Negative for visual disturbance.   Respiratory: Positive for cough.    Cardiovascular: Negative for chest pain.   Gastrointestinal: Negative for abdominal pain.   Musculoskeletal: Negative for back pain.   Skin: Negative for rash.   Neurological: Negative for weakness.    Hematological: Negative for adenopathy.   Psychiatric/Behavioral: Negative for confusion.       Physical Exam     Initial Vitals [05/05/18 0317]   BP Pulse Resp Temp SpO2   118/74 97 16 99.4 °F (37.4 °C) 100 %      MAP       88.67         Physical Exam    Nursing note and vitals reviewed.  Constitutional: She appears well-nourished. No distress.   HENT:   Head: Normocephalic and atraumatic.   Eyes: Conjunctivae and EOM are normal.   Neck: Normal range of motion. Neck supple.   Cardiovascular: Normal rate and regular rhythm.   Pulmonary/Chest: No respiratory distress. She has no wheezes. She has no rhonchi. She has no rales.   Abdominal: Bowel sounds are normal. She exhibits no distension.   Musculoskeletal: She exhibits no edema or tenderness.   Neurological: She is alert and oriented to person, place, and time.   Skin: Skin is warm and dry.   Psychiatric: She has a normal mood and affect. Thought content normal.         ED Course   Procedures  Labs Reviewed - No data to display          Medical Decision Making:   ED Management:  This patient was interviewed and examined emergently.  At this time her vitals were stable. It appears she is progressing with the typical course of acute bronchitis, some expected accompanying symptoms include mild viral conjunctivitis and mild frontal headache (without neurologic sequelae) exacerbated by coughing episodes.  She has no underlying significant lung disease and a chest x-ray reveals no acute process.  I have low suspicion for occult severe cause, including pulmonary embolism.  The patient is educated about symptomatic improvement for bronchitis at home and is asked to follow up with her primary care doctor within the next 2 days to ensure improvement.  She is discharged with prescriptions for multiple symptomatic medications.  She and her mother are agreeable with this plan for follow-up and she was discharged in stable condition.                      Clinical Impression:    The primary encounter diagnosis was Acute bronchitis, unspecified organism. Diagnoses of Cough, Frontal headache, and Viral conjunctivitis of left eye were also pertinent to this visit.    Disposition:   Disposition: Discharged  Condition: Stable                        Bethel Cervantes MD  05/06/18 1957

## 2021-03-31 ENCOUNTER — TELEPHONE (OUTPATIENT)
Dept: OBSTETRICS AND GYNECOLOGY | Facility: CLINIC | Age: 31
End: 2021-03-31